# Patient Record
Sex: MALE | Race: WHITE | NOT HISPANIC OR LATINO | Employment: UNEMPLOYED | URBAN - METROPOLITAN AREA
[De-identification: names, ages, dates, MRNs, and addresses within clinical notes are randomized per-mention and may not be internally consistent; named-entity substitution may affect disease eponyms.]

---

## 2019-10-14 ENCOUNTER — OFFICE VISIT (OUTPATIENT)
Dept: FAMILY MEDICINE CLINIC | Facility: CLINIC | Age: 44
End: 2019-10-14
Payer: COMMERCIAL

## 2019-10-14 VITALS
DIASTOLIC BLOOD PRESSURE: 64 MMHG | OXYGEN SATURATION: 100 % | TEMPERATURE: 98.1 F | RESPIRATION RATE: 18 BRPM | WEIGHT: 142 LBS | BODY MASS INDEX: 22.92 KG/M2 | HEART RATE: 84 BPM | SYSTOLIC BLOOD PRESSURE: 122 MMHG

## 2019-10-14 DIAGNOSIS — E10.36 TYPE 1 DIABETES MELLITUS WITH DIABETIC CATARACT (HCC): Primary | ICD-10-CM

## 2019-10-14 PROCEDURE — 99213 OFFICE O/P EST LOW 20 MIN: CPT | Performed by: FAMILY MEDICINE

## 2019-10-14 RX ORDER — VALSARTAN 80 MG/1
80 TABLET ORAL DAILY
COMMUNITY

## 2019-10-14 RX ORDER — ATORVASTATIN CALCIUM 10 MG/1
10 TABLET, FILM COATED ORAL DAILY
COMMUNITY

## 2019-10-14 NOTE — PROGRESS NOTES
Subjective:     Katie Johnson is a 38 y o  male smoker with controlled DM type I who presents to the office today for a preoperative consultation at the request of surgeon Dr Imelda Redd who plans on performing L cataract surgery on October 24  Patient is currently on an insulin pump of 40 units of humalog/day, and his diabetes is monitored every 3 months by an endocrinologist  Patient notes some fatigue, but denies peripheral neuropathy, polyuria, polydipsia, abdominal pain, N/V, unintentional weight loss  Patient denied any anginal symptoms or dyspnea on exertion  Patient admitted to smoking 1 pack/day for 27 years  Patient is not interested in quitting  This consultation is requested for the specific conditions prompting preoperative evaluation (i e  because of potential affect on operative risk): none  Planned anesthesia: local  The patient has the following known anesthesia issues: none  Patients bleeding risk: no recent abnormal bleeding, no remote history of abnormal bleeding and no use of Ca-channel blockers  Patient does not have objections to receiving blood products if needed  Smoking    The following portions of the patient's history were reviewed and updated as appropriate: current medications, past family history, past medical history and past social history  Review of Systems  Pertinent items are noted in HPI  Objective:  Vitals:    10/14/19 1604   BP: 122/64   Pulse: 84   Resp: 18   Temp: 98 1 °F (36 7 °C)   SpO2: 100%         Physical Exam   Constitutional: He is oriented to person, place, and time  No distress  HENT:   Mouth/Throat: Oropharynx is clear and moist    Eyes: Pupils are equal, round, and reactive to light  Cardiovascular: Normal rate and regular rhythm  Pulses are no weak pulses  No murmur heard  Pulses:       Dorsalis pedis pulses are 2+ on the right side, and 2+ on the left side  Pulmonary/Chest: Effort normal and breath sounds normal  No respiratory distress  He has no wheezes  Abdominal: Soft  Bowel sounds are normal  He exhibits no distension  There is no tenderness  Feet:   Right Foot:   Skin Integrity: Positive for callus  Left Foot:   Skin Integrity: Positive for callus  Neurological: He is alert and oriented to person, place, and time  Skin: Skin is warm  Psychiatric: He has a normal mood and affect  His behavior is normal      Patient's shoes and socks removed  Right Foot/Ankle   Right Foot Inspection  Skin Exam: callus and callus                            Sensory       Monofilament testing: intact  Vascular    The right DP pulse is 2+  Left Foot/Ankle  Left Foot Inspection  Skin Exam: callus                                         Sensory       Monofilament: intact  Vascular    The left DP pulse is 2+  Assign Risk Category:  No deformity present; No loss of protective sensation; No weak pulses       Risk: 0       Predictors of intubation difficulty:   Morbid obesity? no   Anatomically abnormal facies? no   Prominent incisors? no   Receding mandible? no   Short, thick neck? no   Neck range of motion: normal   Mallampati score: I (soft palate, uvula, fauces, and tonsillar pillars visible)  Mouth openin cm   Dentition: No chipped, loose, or missing teeth  Lab Review   Not on record  Patient has physical copy results from lab [facility]  Patient will drop them off tomorrow     Assessment:     38 y o  male with planned surgery as above  Known risk factors for perioperative complications: Diabetes mellitus    Difficulty with intubation is not anticipated  Cardiac Risk Estimation: Unable to calculate without the recent lab, but the patient's type I DM and HTN are under control  Given the low-risk procedure and his controlled cardiovascular risk factors, patient's risk for an adverse cardiac event is <1%    Current medications which may produce withdrawal symptoms if withheld perioperatively: none    Plan:     1   Preoperative workup as follows: labs ordered by ophthalmologist  Patient will bring the lab results for review tomorrow  Patient will also bring the preop medical clearance form tomorrow  2  Change in medication regimen before surgery: none, continue medication regimen including morning of surgery, with sip of water  3  Prophylaxis for cardiac events with perioperative beta-blockers: not indicated  4  Invasive hemodynamic monitoring perioperatively: not indicated  5  Surveillance for postoperative MI with ECG immediately postoperatively and on postoperative days 1 and 2 AND troponin levels 24 hours postoperatively and on day 4 or hospital discharge (whichever comes first): not indicated

## 2019-10-15 ENCOUNTER — TELEPHONE (OUTPATIENT)
Dept: FAMILY MEDICINE CLINIC | Facility: CLINIC | Age: 44
End: 2019-10-15

## 2019-10-15 NOTE — TELEPHONE ENCOUNTER
Pt dropped form off for cataract surgery clearance, copy in chart and form in white team bin  Labs scanned in

## 2019-10-23 ENCOUNTER — TELEPHONE (OUTPATIENT)
Dept: FAMILY MEDICINE CLINIC | Facility: CLINIC | Age: 44
End: 2019-10-23

## 2021-03-10 DIAGNOSIS — Z23 ENCOUNTER FOR IMMUNIZATION: ICD-10-CM

## 2022-11-16 ENCOUNTER — OFFICE VISIT (OUTPATIENT)
Dept: FAMILY MEDICINE CLINIC | Facility: CLINIC | Age: 47
End: 2022-11-16

## 2022-11-16 VITALS
SYSTOLIC BLOOD PRESSURE: 130 MMHG | HEIGHT: 66 IN | OXYGEN SATURATION: 99 % | HEART RATE: 90 BPM | BODY MASS INDEX: 22.5 KG/M2 | WEIGHT: 140 LBS | RESPIRATION RATE: 16 BRPM | TEMPERATURE: 97.8 F | DIASTOLIC BLOOD PRESSURE: 70 MMHG

## 2022-11-16 DIAGNOSIS — E10.36 TYPE 1 DIABETES MELLITUS WITH DIABETIC CATARACT (HCC): ICD-10-CM

## 2022-11-16 DIAGNOSIS — Z01.818 PRE-OP EXAMINATION: Primary | ICD-10-CM

## 2022-11-16 DIAGNOSIS — H26.9 CATARACT OF BOTH EYES, UNSPECIFIED CATARACT TYPE: ICD-10-CM

## 2022-11-16 DIAGNOSIS — E78.5 HYPERLIPIDEMIA, UNSPECIFIED HYPERLIPIDEMIA TYPE: ICD-10-CM

## 2022-11-16 RX ORDER — LISINOPRIL 10 MG/1
TABLET ORAL
COMMUNITY
Start: 2022-08-22

## 2022-11-16 RX ORDER — ATORVASTATIN CALCIUM 40 MG/1
TABLET, FILM COATED ORAL
COMMUNITY
Start: 2022-11-07

## 2022-11-16 RX ORDER — BLOOD-GLUCOSE METER
EACH MISCELLANEOUS
COMMUNITY
Start: 2022-11-05

## 2022-11-16 NOTE — PATIENT INSTRUCTIONS
- Patient has been instructed to avoid herbs or non-directed vitamins the week prior to surgery to ensure no drug interactions with perioperative surgical and anesthetic medications  - Patient should continue antihypertensive medications up through and including the day of surgery  - Patient should continue his statin medication up through and including the day of surgery    - Insulin Management: will discuss with endocronologist before surgery as has insulin pump

## 2022-11-16 NOTE — PROGRESS NOTES
FAMILY PRACTICE PRE-OPERATIVE EVALUATION  [facility]    NAME: Cynthia Andujar  AGE: 49 y o  SEX: male  : 1975     DATE: 2022    Family Practice Pre-Operative Evaluation      Chief Complaint: Pre-operative Evaluation      Surgery: bilateral cataract repair  Anticipated Date of Surgery: 2022 on left eye and 2022 on right eye  Surgeon: Dr Bhavna Stokes      History of Present Illness:     HPI  Patient is here for pre op exam before upcoming surgery  Denies any chest pain, sob, headache and dizziness  Complaint with his medications and managed by endo  Have not done blood work from couple of years as ran out of insurance and will be doing next week  Refused flu and pneumonia and Tdap vaccine    Anesthesia:  local, IV sedation and MAC  Bleeding Risk: no recent abnormal bleeding, no remote history of abnormal bleeding and no use of Ca-channel blockers  Current Anti-platelet/anticoagulation medication: none    Assessment of Cardiac Risk:  Denies unstable or severe angina or MI in the last 6 weeks or history of stent placement in the last year   Denies decompensated heart failure (e g  New onset heart failure, NYHA functional class IV heart failure, or worsening existing heart failure)  Denies significant arrhythmias such as high grade AV block, symptomatic ventricular arrhythmia, newly recognized ventricular tachycardia, supraventricular tachycardia with resting heart rate >100, or symptomatic bradycardia  Denies severe heart valve disease including aortic stenosis or symptomatic mitral stenosis     Exercise Capacity:  Able to walk 4 blocks without symptoms?: No  Able to walk 2 flights without symptoms?: No    Prior Anesthesia Reactions: No     Personal history of venous thromboembolic disease? No    History of steroid use for >2 weeks within last year? No         Review of Systems:     Review of Systems   Constitutional: Negative  HENT: Negative  Eyes: Positive for visual disturbance  Respiratory: Negative  Cardiovascular: Negative  Gastrointestinal: Negative  Genitourinary: Negative  Musculoskeletal: Negative  Skin: Negative  Neurological: Negative  Psychiatric/Behavioral: Negative          Current Problem List:     Patient Active Problem List   Diagnosis   • Type I diabetes mellitus (HCC)       Allergies:     No Known Allergies    Current Medications:       Current Outpatient Medications:   •  atorvastatin (LIPITOR) 40 mg tablet, , Disp: , Rfl:   •  Blood Glucose Monitoring Suppl (OneTouch Verio Flex System) w/Device KIT, , Disp: , Rfl:   •  HUMALOG 100 UNIT/ML injection, , Disp: , Rfl:   •  lisinopril (ZESTRIL) 10 mg tablet, , Disp: , Rfl:   •  ONE TOUCH ULTRA TEST test strip, , Disp: , Rfl:     Past Medical History:       Past Medical History:   Diagnosis Date   • [facility]    • Hypertension    • Retinopathy         Past Surgical History:   Procedure Laterality Date   • HAND SURGERY Left         Family History   Problem Relation Age of Onset   • Thyroid disease Mother         Social History     Socioeconomic History   • Marital status: Single     Spouse name: Not on file   • Number of children: Not on file   • Years of education: Not on file   • Highest education level: Not on file   Occupational History   • Not on file   Tobacco Use   • Smoking status: Every Day   • Smokeless tobacco: Never   Vaping Use   • Vaping Use: Never used   Substance and Sexual Activity   • Alcohol use: Never   • Drug use: Never   • Sexual activity: Not on file   Other Topics Concern   • Not on file   Social History Narrative   • Not on file     Social Determinants of Health     Financial Resource Strain: Not on file   Food Insecurity: Not on file   Transportation Needs: Not on file   Physical Activity: Not on file   Stress: Not on file   Social Connections: Not on file   Intimate Partner Violence: Not on file   Housing Stability: Not on file        Physical Exam:     /70   Pulse 90   Temp 97 8 °F (36 6 °C)   Resp 16   Ht 5' 6" (1 676 m)   Wt 63 5 kg (140 lb)   SpO2 99%   BMI 22 60 kg/m²     Physical Exam  Constitutional:       Appearance: Normal appearance  HENT:      Head: Normocephalic  Nose: Nose normal       Mouth/Throat:      Pharynx: No pharyngeal swelling, oropharyngeal exudate or posterior oropharyngeal erythema  Eyes:      Conjunctiva/sclera: Conjunctivae normal    Cardiovascular:      Rate and Rhythm: Normal rate and regular rhythm  Pulses: no weak pulses          Dorsalis pedis pulses are 1+ on the right side and 1+ on the left side  Posterior tibial pulses are 1+ on the right side and 1+ on the left side  Heart sounds: Normal heart sounds  Pulmonary:      Effort: Pulmonary effort is normal       Breath sounds: Normal breath sounds  Musculoskeletal:         General: No swelling or tenderness  Normal range of motion  Feet:      Right foot:      Skin integrity: No ulcer, skin breakdown, erythema, warmth, callus or dry skin  Left foot:      Skin integrity: No ulcer, skin breakdown, erythema, warmth, callus or dry skin  Skin:     General: Skin is warm and dry  Findings: No rash  Neurological:      Mental Status: He is alert and oriented to person, place, and time  Psychiatric:         Mood and Affect: Mood normal          Behavior: Behavior normal          Thought Content: Thought content normal          Judgment: Judgment normal           Data:     Pre-operative work-up    Laboratory Results:scheduled to do next week with endo and will fax  Us results     EKG: as per eye surgeon office, no EKG needed    No results found for this or any previous visit (from the past 672 hour(s))          Assessment & Recommendations:     Problem List Items Addressed This Visit        Endocrine    Type I diabetes mellitus ([facility] )   Other Visit Diagnoses     Pre-op examination    -  Primary    Cataract of both eyes, unspecified cataract type        Hyperlipidemia, unspecified hyperlipidemia type        complaint with statin and tolerating it well    Relevant Medications    atorvastatin (LIPITOR) 40 mg tablet        1  Pre-op examination    2  Cataract of both eyes, unspecified cataract type    3  Type 1 diabetes mellitus with diabetic cataract ([address] )  Comments:  managed by endo    4  Hyperlipidemia, unspecified hyperlipidemia type  Comments:  complaint with statin and tolerating it well      Pre-Op Evaluation Assessment  49 y o  male with planned surgery: bilateral cataract repair  Known risk factors for perioperative complications: Diabetes mellitus  Current medications which may produce withdrawal symptoms if withheld perioperatively: none    Pre-Op Evaluation Plan  1  Further preoperative workup as follows:   - None; no further preoperative work-up is required    2  Medication Management/Recommendations:   - Patient has been instructed to avoid herbs or non-directed vitamins the week prior to surgery to ensure no drug interactions with perioperative surgical and anesthetic medications  - Patient should continue antihypertensive medications up through and including the day of surgery  - Patient should continue his statin medication up through and including the day of surgery  - Insulin Management: will discuss with endocronologist before surgery as has insulin pump        3  Patient requires further consultation with: None     Future Appointments   Date Time Provider Niyah Faulkner   3/22/2023  6:00 PM Joe Foley MD [facility]         CONSTANCE Risk:  GFR:        For PCP:  If GFR>60, Hold ACE/ARB/Diuretic on the day of surgery, and NSAIDS 10 days before  If GFR<45, Consider PRE and POST op Nephrology Consult  If 46 <GFR> 59 : Has Patient had CONSTANCE in last 6 Months? no   If YES: Preop Nephrology consult   If No:  [facility] [facility] Nephrology consult       Patient's shoes and socks removed  Right Foot/Ankle   Right Foot Inspection  Skin Exam: skin normal and skin intact  No dry skin, no warmth, no callus, no erythema, no maceration, no abnormal color, no pre-ulcer, no ulcer and no callus  Toe Exam: ROM and strength within normal limits  No swelling, no tenderness, erythema and  no right toe deformity    Sensory   Vibration: intact  Monofilament testing: intact    Vascular  Capillary refills: < 3 seconds  The right DP pulse is 1+  The right PT pulse is 1+  Left Foot/Ankle  Left Foot Inspection  Skin Exam: skin normal and skin intact  No dry skin, no warmth, no erythema, no maceration, normal color, no pre-ulcer, no ulcer and no callus  Toe Exam: ROM and strength within normal limits  No swelling, no tenderness, no erythema and no left toe deformity  Sensory   Vibration: intact  Monofilament testing: intact    Vascular  Capillary refills: < 3 seconds  The left DP pulse is 1+  The left PT pulse is 1+       Assign Risk Category  No deformity present  No loss of protective sensation  No weak pulses  Risk: 0      Clearance  Clearance is pending as waiting for blood work as patient is diabetic and stated that will be getting blood work done next week for endocrinologist and will fax that      Warden Wade [facility]  [address]  [address]  [place] [address]  Phone#  [phone]  Fax#  [phone]

## 2022-11-19 LAB
CREAT ?TM UR-SCNC: 170.7 UMOL/L
EXT ALBUMIN URINE RANDOM: 101.5
HBA1C MFR BLD HPLC: 8.9 %
MICROALBUMIN/CREAT UR: 59 MG/G{CREAT}

## 2022-11-25 ENCOUNTER — TELEPHONE (OUTPATIENT)
Dept: FAMILY MEDICINE CLINIC | Facility: CLINIC | Age: 47
End: 2022-11-25

## 2022-11-25 NOTE — LETTER
December 1, 2022     Patient: Cirilo Dunbar  YOB: 1975  Date of Visit: 11/16/2022    To Whom it May Concern:    Alejandra Echevarria is medically clear for upcoming eye surgery under IV sedation and local anaesthesia  Patient informed that as his HbA1c is 8 9 and there is a risk of post op infection and delayed healing and patient understands that and would like to go proceed with surgery at this time  Surgeon office should also get clearance from patient's endocrinologist too before proceeding with surgery  If you have any questions or concerns, please don't hesitate to call  Sincerely,        SG Badillo            CC:  No Recipients

## 2022-11-25 NOTE — TELEPHONE ENCOUNTER
Pt was recently here for a pre op with Walt Handy and she needed copies of his latest BW, they are in nurse pending one  Pt aware Walt Handy is not here today

## 2022-12-01 NOTE — TELEPHONE ENCOUNTER
Patient called and labs reviewed and HbA1cv is 8 9 and stated that been cleared by endo and informed that did not receive any paperwork from surgeon office for pre op clearance and will call and get forms for us to do   SG Rajput

## 2022-12-01 NOTE — TELEPHONE ENCOUNTER
Please addend Preop clearance note to state patient was cleared after labs     Please fax to [phone]

## 2022-12-01 NOTE — TELEPHONE ENCOUNTER
Patient called back and informed that as his HbA1c is 8 9 and there is a risk of post op infection and delayed healing and patient understands that and would like to go proceed with surgery at this time  Letter and note faxed to surgeon office   SG Byrd

## 2023-03-27 ENCOUNTER — TELEPHONE (OUTPATIENT)
Dept: FAMILY MEDICINE CLINIC | Facility: CLINIC | Age: 48
End: 2023-03-27

## 2024-06-12 ENCOUNTER — TELEPHONE (OUTPATIENT)
Age: 49
End: 2024-06-12

## 2024-06-12 NOTE — TELEPHONE ENCOUNTER
Pt established care with [facility] 11/16/22. Has not been to [place] since 2019.     [facility] clerical- please change PCP to your office.     Thank you.